# Patient Record
Sex: MALE | Race: BLACK OR AFRICAN AMERICAN
[De-identification: names, ages, dates, MRNs, and addresses within clinical notes are randomized per-mention and may not be internally consistent; named-entity substitution may affect disease eponyms.]

---

## 2021-10-20 ENCOUNTER — HOSPITAL ENCOUNTER (EMERGENCY)
Dept: HOSPITAL 56 - MW.ED | Age: 31
Discharge: HOME | End: 2021-10-20
Payer: SELF-PAY

## 2021-10-20 DIAGNOSIS — M72.2: Primary | ICD-10-CM

## 2021-10-20 NOTE — EDM.PDOC
ED HPI GENERAL MEDICAL PROBLEM





- General


Chief Complaint: Lower Extremity Injury/Pain


Stated Complaint: RT HEAL RODRÍGUEZ


Time Seen by Provider: 10/20/21 14:32


Source of Information: Reports: Patient


History Limitations: Reports: No Limitations





- History of Present Illness


INITIAL COMMENTS - FREE TEXT/NARRATIVE: 


HISTORY AND PHYSICAL:





History of present illness:


Patient is a 31-year-old male who presents to the emergency room with complaints

of right heel pain.  He denies any injury, trauma or falls.  Denies any 

numbness, tingling, saddle paresthesia or weakness.  He is able to bear weight 

and perform routine ADLs. Patient denies any fever, chills, headache, change in 

vision, syncope or near syncope. Denies any chest pain, back pain, shortness of 

breath or cough. Denies any GI or  symptoms. No recent travel or sick 

contacts.





Review of systems: 


As per history of present illness and below otherwise all systems reviewed and 

negative.





Past medical history: 


As per history of present illness and as reviewed below otherwise 

noncontributory.





Surgical history: 


As per history of present illness and as reviewed below otherwise 

noncontributory.





Social history: 


See social history for further information





Family history: 


As per history of present illness and as reviewed below otherwise 

noncontributory.





Physical exam:


General: Well developed and well nourished. Alert and orientated x 3. Nontoxic 

in appearance and in no acute distress. Vital signs are stable and have been 

reviewed by me. Nursing notes were reviewed. 


HEENT: Atraumatic, normocephalic, pupils equal and reactive bilaterally, 

negative for conjunctival pallor or scleral icterus, mucous membranes moist, TMs

normal bilaterally, throat clear, neck supple, nontender, trachea midline. No 

drooling or trismus noted. No meningeal signs. No hot potato voice noted. 


Lungs: Clear to auscultation bilaterally. No wheezes, rales, or rhonchi. Chest 

nontender. Normal work of breathing, no accessory muscles used.


Heart: S1S2, regular rate and rhythm without overt murmur, gallops, or rubs. No 

JVD. No peripheral edema


Abdomen: Soft, nondistended, nontender. 


Skin: Intact, warm, dry. No lesions or rashes noted.


Hematologic: No petechiae or purpra. Mucosa appropriate color and normal nail 

bed color and refill.


Extremities: Atraumatic, moves all extremities per self without difficulty or 

deficits, negative for cords or calf pain. Pinpoint tenderness of medial 

tubercle. Neurovascular unremarkable.


Neuro: Awake, alert, oriented. Cranial nerves II through XII unremarkable. 

Cerebellum unremarkable. Motor and sensory unremarkable throughout. Exam 

nonfocal.


Psychiatric: Mood and affect are appropriate.  Normal thought process. Answering

questions appropriately.





Please note that the patient was seen and evaluated during the 2020 SARS-CoV-2 

novel coronavirus pandemic period.  Community viral transmission is ongoing at 

time of this encounter and the emergency department is operating under pandemic 

response procedures.





Medical Decision Making:


Negative for fracture or significant bone lesion. Soft tissues radiographically 

unremarkable. We discussed plantar fasciitis and treatment options. I have prashant

ked with the patient about today's findings, in addition to providing specific 

details for plan of care.  Reassessment at the time of disposition demonstrates 

that the patient is in no acute distress.  The patient is stable for discharge, 

counseling was provided and we discussed in great detail signs and symptoms that

would prompt them to return to the Emergency Department. Medication, follow up a

nd supportive care measures were reviewed and discussed. Voices understanding 

and is agreeable to plan of care. Denies any further questions or concerns at 

this time.





Diagnostics:


Calcaneus x-ray





Therapeutics:


None





Prescription:


Diclofenac





Impression: 


Plantar fasciitis





Plan:


1.  You were evaluated today on an emergent basis. Your x-ray shows no evidence 

of fracture or heel spur. Rest, ice, and supportive shoes with heel/arch support

inserts.


2.  You can alternate Tylenol and ibuprofen as needed for pain and fever 

management.


3. We encourage you to follow up with Podiatry in the next few days for re-

evaluation and further care/management. 


4. If your symptoms should worsen, new symptoms develop or any of the signs and 

symptoms we discussed should arise please return to the emergency room or call 

911 (if needed).





Definitive disposition and diagnosis as appropriate pending reevaluation and 

review of above.





  ** Foot


Pain Score (Numeric/FACES): 4





- Related Data


                                    Allergies











Allergy/AdvReac Type Severity Reaction Status Date / Time


 


No Known Allergies Allergy   Verified 10/20/21 16:00











Home Meds: 


                                    Home Meds





Diclofenac Sodium [Voltaren] 75 mg PO BIDMEALS PRN #30 tab.cr 10/20/21 [Rx]











Social & Family History





- Tobacco Use


Second Hand Smoke Exposure: No





- Caffeine Use


Caffeine Use: Reports: None





- Recreational Drug Use


Recreational Drug Use: No





Review of Systems





- Review of Systems


Review Of Systems: Comprehensive ROS is negative, except as noted in HPI.





ED EXAM, GENERAL





- Physical Exam


Exam: See Below (See dictation)





Course





- Vital Signs


Last Recorded V/S: 


                                Last Vital Signs











Temp  98 F   10/20/21 15:57


 


Pulse  67   10/20/21 15:57


 


Resp  20   10/20/21 15:57


 


BP  128/93 H  10/20/21 15:57


 


Pulse Ox  99   10/20/21 15:57














Departure





- Departure


Time of Disposition: 16:53


Disposition: Home, Self-Care 01


Clinical Impression: 


 Plantar fasciitis of right foot








- Discharge Information


Prescriptions: 


Diclofenac Sodium [Voltaren] 75 mg PO BIDMEALS PRN #30 tab.cr


 PRN Reason: Pain


Instructions:  Plantar Fasciitis


Referrals: 


PCP,None [Primary Care Provider] - 


Forms:  ED Department Discharge


Additional Instructions: 


The following information is given to patients seen in the emergency department 

who are being discharged to home. This information is to outline your options 

for follow-up care. We provide all patients seen in our emergency department 

with a follow-up referral.





The need for follow-up, as well as the timing and circumstances, are variable 

depending upon the specifics of your emergency department visit.





If you don't have a primary care physician on staff, we will provide you with a 

referral. We always advise you to contact your personal physician following an 

emergency department visit to inform them of the circumstance of the visit and 

for follow-up with them and/or the need for any referrals to a consulting 

specialist.





The emergency department will also refer you to a specialist when appropriate. 

This referral assures that you have the opportunity for follow-up care with a 

specialist. All of these measure are taken in an effort to provide you with 

optimal care, which includes your follow-up.





Under all circumstances we always encourage you to contact your private 

physician who remains a resource for coordinating your care. When calling for 

follow-up care, please make the office aware that this follow-up is from your 

recent emergency room visit. If for any reason you are refused follow-up, please

contact the [facility] Emergency Department

at [phone] and asked to speak to the emergency department charge nurse.





[facility]


Primary Care


[address] [place] [address]


Phone: [phone]


Fax: [phone]





[facility]


[address] [place] [address]


Phone: [phone]


Fax: [phone]





Thank you for choosing the [facility] emergency department in 

[place] for your medical needs today.  It was a pleasure caring for you. Today

you were seen in the emergency department for heel pain.





Your prescription was electronically sent to: [facility]





1.  You were evaluated today on an emergent basis. Your x-ray shows no evidence 

of fracture or heel spur. Rest, ice, and supportive shoes with heel/arch support

inserts.


2.  You can alternate Tylenol and ibuprofen as needed for pain and fever 

management.


3. We encourage you to follow up with Podiatry in the next few days for re-

evaluation and further care/management. 


4. If your symptoms should worsen, new symptoms develop or any of the signs and 

symptoms we discussed should arise please return to the emergency room or call 

911 (if needed).





Sepsis Event Note (ED)





- Evaluation


Sepsis Screening Result: No Definite Risk





- Focused Exam


Vital Signs: 


                                   Vital Signs











  Temp Pulse Resp BP Pulse Ox


 


 10/20/21 15:57  98 F  67  20  128/93 H  99

## 2022-09-27 ENCOUNTER — HOSPITAL ENCOUNTER (EMERGENCY)
Dept: HOSPITAL 56 - MW.ED | Age: 32
Discharge: HOME | End: 2022-09-27
Payer: SELF-PAY
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
## 2022-09-27 DIAGNOSIS — S60.042A: Primary | ICD-10-CM

## 2022-09-27 DIAGNOSIS — Z23: ICD-10-CM

## 2022-09-27 DIAGNOSIS — Z88.0: ICD-10-CM
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
## 2022-09-27 PROCEDURE — 90715 TDAP VACCINE 7 YRS/> IM: CPT

## 2022-09-27 PROCEDURE — 99283 EMERGENCY DEPT VISIT LOW MDM: CPT

## 2022-09-27 PROCEDURE — 73140 X-RAY EXAM OF FINGER(S): CPT

## 2022-09-27 PROCEDURE — 90471 IMMUNIZATION ADMIN: CPT

## 2023-03-02 ENCOUNTER — HOSPITAL ENCOUNTER (EMERGENCY)
Dept: HOSPITAL 56 - MW.ED | Age: 33
Discharge: HOME | End: 2023-03-02
Payer: SELF-PAY

## 2023-03-02 DIAGNOSIS — Z88.0: ICD-10-CM

## 2023-03-02 DIAGNOSIS — K08.89: Primary | ICD-10-CM

## 2023-03-02 PROCEDURE — 99282 EMERGENCY DEPT VISIT SF MDM: CPT
